# Patient Record
Sex: MALE | Race: ASIAN | NOT HISPANIC OR LATINO | URBAN - METROPOLITAN AREA
[De-identification: names, ages, dates, MRNs, and addresses within clinical notes are randomized per-mention and may not be internally consistent; named-entity substitution may affect disease eponyms.]

---

## 2022-01-31 ENCOUNTER — OFFICE (OUTPATIENT)
Dept: URBAN - METROPOLITAN AREA CLINIC 67 | Facility: CLINIC | Age: 62
End: 2022-01-31
Payer: COMMERCIAL

## 2022-01-31 VITALS
WEIGHT: 148 LBS | SYSTOLIC BLOOD PRESSURE: 118 MMHG | HEART RATE: 84 BPM | DIASTOLIC BLOOD PRESSURE: 70 MMHG | HEIGHT: 66 IN

## 2022-01-31 DIAGNOSIS — R93.2 ABNORMAL FINDINGS ON DIAGNOSTIC IMAGING OF LIVER AND BILIARY: ICD-10-CM

## 2022-01-31 DIAGNOSIS — R30.0 DYSURIA: ICD-10-CM

## 2022-01-31 DIAGNOSIS — K74.60 UNSPECIFIED CIRRHOSIS OF LIVER: ICD-10-CM

## 2022-01-31 DIAGNOSIS — R35.0 FREQUENCY OF MICTURITION: ICD-10-CM

## 2022-01-31 DIAGNOSIS — R74.8 ABNORMAL LEVELS OF OTHER SERUM ENZYMES: ICD-10-CM

## 2022-01-31 PROCEDURE — 99204 OFFICE O/P NEW MOD 45 MIN: CPT | Performed by: SPECIALIST

## 2022-01-31 NOTE — SERVICENOTES
Patient is on asthma meds from [place].   doubt cirrhosis is cause of his urologic symptoms.  Lengthy doiscussion with patient, daughter and son-in-law.  Needs w/u

## 2022-01-31 NOTE — SERVICEHPINOTES
Kaiser Sepulveda   is seen for an initial visit today.     Pt has hx Hepatitis C and was treated in his [place] with [facility] about 5 years ago. It sounds like he had an SVR.  He describes EGD with banding but no f/u CT, ETC He is visiting his daughter and son-in-law and he has had dysuria and urine frequency. [facility] and was treated for UTI and then cystoscopy and Urolift by Dr Menendez and cystoscopy with debris but no infection.  He is in [place] visiting family until May 2022.  Work up to r/o kidney stones ordered by Dr Byrd and it appears to be cirrhosis and 3.2 cm lesion right lobe of liver  An